# Patient Record
Sex: FEMALE | URBAN - METROPOLITAN AREA
[De-identification: names, ages, dates, MRNs, and addresses within clinical notes are randomized per-mention and may not be internally consistent; named-entity substitution may affect disease eponyms.]

---

## 2024-06-03 ENCOUNTER — APPOINTMENT (OUTPATIENT)
Dept: URBAN - METROPOLITAN AREA CLINIC 314 | Age: 39
Setting detail: DERMATOLOGY
End: 2024-06-04

## 2024-06-03 DIAGNOSIS — L30.9 DERMATITIS, UNSPECIFIED: ICD-10-CM

## 2024-06-03 DIAGNOSIS — L82.1 OTHER SEBORRHEIC KERATOSIS: ICD-10-CM

## 2024-06-03 PROCEDURE — OTHER COUNSELING: OTHER

## 2024-06-03 PROCEDURE — OTHER MIPS QUALITY: OTHER

## 2024-06-03 PROCEDURE — 99204 OFFICE O/P NEW MOD 45 MIN: CPT

## 2024-06-03 PROCEDURE — OTHER PRESCRIPTION: OTHER

## 2024-06-03 PROCEDURE — OTHER ADDITIONAL NOTES: OTHER

## 2024-06-03 RX ORDER — TRIAMCINOLONE ACETONIDE 1 MG/G
CREAM TOPICAL BID
Qty: 80 | Refills: 1 | Status: ERX | COMMUNITY
Start: 2024-06-03

## 2024-06-03 RX ORDER — METRONIDAZOLE 7.5 MG/G
GEL TOPICAL
Qty: 45 | Refills: 1 | Status: ERX | COMMUNITY
Start: 2024-06-03

## 2024-06-03 ASSESSMENT — LOCATION DETAILED DESCRIPTION DERM
LOCATION DETAILED: SUPERIOR THORACIC SPINE
LOCATION DETAILED: LEFT INFERIOR UPPER BACK
LOCATION DETAILED: MIDDLE STERNUM

## 2024-06-03 ASSESSMENT — LOCATION SIMPLE DESCRIPTION DERM
LOCATION SIMPLE: UPPER BACK
LOCATION SIMPLE: CHEST
LOCATION SIMPLE: LEFT UPPER BACK

## 2024-06-03 ASSESSMENT — LOCATION ZONE DERM: LOCATION ZONE: TRUNK

## 2024-06-03 NOTE — HPI: RASH
What Type Of Note Output Would You Prefer (Optional)?: Standard Output
How Severe Is Your Rash?: moderate
Is This A New Presentation, Or A Follow-Up?: Rash
Additional History: Pt has tried itraconazole 200 mg tablet, fluconazole tablet, tacrolimus 0.1%, ketoconazole 2% shampoo and ointment, metronidazole gel for face, doxycyclome monohydrate avidoxy 100

## 2024-06-03 NOTE — PROCEDURE: ADDITIONAL NOTES
Render Risk Assessment In Note?: no
Detail Level: Simple
Additional Notes: Discussed biopsy for further workup vs trial other medications\\n\\nPt elected to try topicals first, will revisit biopsy at f/u.\\n\\nShe has had some improvement w/ ?fluconazole vs itraconazole. However, recurred after 2mo and did not respond again.\\nTopicals largely unhelpful, protopic stung.\\nMetrocream helps a rash on face (clear today; ?perioral --although reported no response of either condition to doxy) but did not help chest/back\\n\\nDiscussed use of metrocream BID PRN flares face\\nDiscussed trial of triam (for possible eczema) to AA body

## 2024-07-03 ENCOUNTER — APPOINTMENT (OUTPATIENT)
Dept: URBAN - METROPOLITAN AREA CLINIC 314 | Age: 39
Setting detail: DERMATOLOGY
End: 2024-07-03

## 2024-07-03 DIAGNOSIS — L20.89 OTHER ATOPIC DERMATITIS: ICD-10-CM

## 2024-07-03 PROCEDURE — OTHER COUNSELING: OTHER

## 2024-07-03 PROCEDURE — OTHER PRESCRIPTION MEDICATION MANAGEMENT: OTHER

## 2024-07-03 PROCEDURE — 99213 OFFICE O/P EST LOW 20 MIN: CPT

## 2024-07-03 PROCEDURE — OTHER MIPS QUALITY: OTHER

## 2024-07-03 ASSESSMENT — LOCATION SIMPLE DESCRIPTION DERM
LOCATION SIMPLE: CHEST
LOCATION SIMPLE: UPPER BACK

## 2024-07-03 ASSESSMENT — LOCATION ZONE DERM: LOCATION ZONE: TRUNK

## 2024-07-03 ASSESSMENT — LOCATION DETAILED DESCRIPTION DERM
LOCATION DETAILED: MIDDLE STERNUM
LOCATION DETAILED: SUPERIOR THORACIC SPINE

## 2024-07-03 NOTE — PROCEDURE: COUNSELING
Cleanser Recommendations: Dove Sensitive
Patient Specific Counseling (Will Not Stick From Patient To Patient): -\\n\\nHPI 1st OV:\\nRash x 5 years. Tried itraconazole 200 mg tablet, fluconazole tablet, tacrolimus 0.1%, ketoconazole 2% shampoo and ointment, metronidazole gel for face, doxycyclome monohydrate avidoxy 100\\nReported some improvement w/ ?fluconazole vs itraconazole. However, recurred after 2mo and did not respond again.\\nTopicals largely unhelpful, protopic stung.\\nMetrocream helps a rash on face (clear today; ?perioral --although reported no response of either condition to doxy) but did not help chest/back\\nDiscussed use of metrocream BID PRN flares face\\nDiscussed trial of triam (for possible eczema) to AA body
Detail Level: Zone
Moisturizer Recommendations: CeraVe/ Cetaphil
Detail Level: Detailed

## 2024-07-03 NOTE — PROCEDURE: PRESCRIPTION MEDICATION MANAGEMENT
Continue Regimen: Triamcinolone Acetonide 0.1 % topical cream twice daily for up to two weeks when flaring
Detail Level: Zone
Plan: Likely papular eczema. Reports dramatic improvement w/ topical steroid, cleared w/in a few days of triam use. Intermittently flares. Reviewed use of TCS and recommended pay attention to how frequently she is requiring use, recheck in a few months.
Render In Strict Bullet Format?: No

## 2024-07-03 NOTE — PROCEDURE: MIPS QUALITY
Quality 486: Dermatitis - Improvement In Patient-Reported Itch Severity: Itch severity assessment score is reduced by 3 or more points from the initial (index) assessment score to the follow-up visit score
Quality 226: Preventive Care And Screening: Tobacco Use: Screening And Cessation Intervention: Patient screened for tobacco use and is an ex/non-smoker
Quality 47: Advance Care Plan: Advance care planning not documented, reason not otherwise specified.
Detail Level: Detailed

## 2024-12-16 ENCOUNTER — APPOINTMENT (OUTPATIENT)
Dept: URBAN - METROPOLITAN AREA CLINIC 314 | Age: 39
Setting detail: DERMATOLOGY
End: 2024-12-29

## 2024-12-16 DIAGNOSIS — L21.8 OTHER SEBORRHEIC DERMATITIS: ICD-10-CM

## 2024-12-16 DIAGNOSIS — B35.1 TINEA UNGUIUM: ICD-10-CM

## 2024-12-16 DIAGNOSIS — L20.89 OTHER ATOPIC DERMATITIS: ICD-10-CM

## 2024-12-16 PROCEDURE — OTHER PRESCRIPTION: OTHER

## 2024-12-16 PROCEDURE — OTHER COUNSELING: OTHER

## 2024-12-16 PROCEDURE — 99214 OFFICE O/P EST MOD 30 MIN: CPT

## 2024-12-16 PROCEDURE — OTHER ADDITIONAL NOTES: OTHER

## 2024-12-16 PROCEDURE — OTHER PRESCRIPTION MEDICATION MANAGEMENT: OTHER

## 2024-12-16 RX ORDER — TRIAMCINOLONE ACETONIDE 1 MG/G
CREAM TOPICAL
Qty: 454 | Refills: 1 | Status: ERX | COMMUNITY
Start: 2024-12-16

## 2024-12-16 RX ORDER — KETOCONAZOLE 20 MG/ML
SHAMPOO, SUSPENSION TOPICAL QOD
Qty: 120 | Refills: 11 | Status: ERX | COMMUNITY
Start: 2024-12-16

## 2024-12-16 ASSESSMENT — LOCATION SIMPLE DESCRIPTION DERM
LOCATION SIMPLE: RIGHT GREAT TOE
LOCATION SIMPLE: UPPER BACK
LOCATION SIMPLE: RIGHT 2ND TOE
LOCATION SIMPLE: LEFT GREAT TOE
LOCATION SIMPLE: CHEST
LOCATION SIMPLE: FRONTAL SCALP

## 2024-12-16 ASSESSMENT — LOCATION DETAILED DESCRIPTION DERM
LOCATION DETAILED: LEFT GREAT TOENAIL
LOCATION DETAILED: SUPERIOR THORACIC SPINE
LOCATION DETAILED: MIDDLE STERNUM
LOCATION DETAILED: MEDIAL FRONTAL SCALP
LOCATION DETAILED: RIGHT GREAT TOENAIL
LOCATION DETAILED: RIGHT 2ND TOENAIL

## 2024-12-16 ASSESSMENT — LOCATION ZONE DERM
LOCATION ZONE: SCALP
LOCATION ZONE: TOENAIL
LOCATION ZONE: TRUNK

## 2024-12-16 NOTE — PROCEDURE: COUNSELING
Cleanser Recommendations: Dove Sensitive
Patient Specific Counseling (Will Not Stick From Patient To Patient): -\\n\\nHPI 1st OV:\\nRash x 5 years. Tried itraconazole 200 mg tablet, fluconazole tablet, tacrolimus 0.1%, ketoconazole 2% shampoo and ointment, metronidazole gel for face, doxycyclome monohydrate avidoxy 100\\nReported some improvement w/ ?fluconazole vs itraconazole. However, recurred after 2mo and did not respond again.\\nTopicals largely unhelpful, protopic stung.\\nMetrocream helps a rash on face (clear today; ?perioral --although reported no response of either condition to doxy) but did not help chest/back\\nDiscussed use of metrocream BID PRN flares face\\nDiscussed trial of triam (for possible eczema) to AA body
Detail Level: Zone
Moisturizer Recommendations: CeraVe/ Cetaphil
Detail Level: Detailed
Patient Specific Counseling (Will Not Stick From Patient To Patient): -\\nDiscussed RTC for clipping to pursue treatment if c/w onychomycosis; pt may return after new year when nails not recently painted/longer

## 2024-12-16 NOTE — PROCEDURE: PRESCRIPTION MEDICATION MANAGEMENT
Continue Regimen: Triamcinolone Acetonide 0.1 % topical cream twice daily for up to two weeks when flaring
Detail Level: Zone
Plan: Likely papular eczema. Reports dramatic improvement w/ topical steroid, cleared w/in a few days of triam use. Intermittently flares. Reviewed use of TCS 
Render In Strict Bullet Format?: No
Initiate Treatment: ketoconazole 2 % shampoo QOD\\nQuantity: 120.0 ml  Days Supply: 30\\nSig: Lather onto scalp, let sit for 5 minutes, and then rinse. Use 3x weekly

## 2024-12-16 NOTE — PROCEDURE: ADDITIONAL NOTES
Render Risk Assessment In Note?: no
Detail Level: Simple
Additional Notes: We discussed other non-steroidal topicals or injections if pt wants to discontinue to Triamcinolone or is using frequently

## 2024-12-16 NOTE — HPI: BODY LOCATION - FEET
How Severe Is Your Condition?: moderate
Additional History: Pt self Tx with anti fungal polish and tea tree oil

## 2024-12-16 NOTE — HPI: DRY SKIN
How Severe Is Your Dry Skin?: moderate
Is This A New Presentation Or A Follow-Up?: Dry Skin
Additional History: Washes hair every two days